# Patient Record
Sex: FEMALE | Race: WHITE | Employment: FULL TIME | URBAN - METROPOLITAN AREA
[De-identification: names, ages, dates, MRNs, and addresses within clinical notes are randomized per-mention and may not be internally consistent; named-entity substitution may affect disease eponyms.]

---
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
## 2021-12-26 ENCOUNTER — HOSPITAL ENCOUNTER (EMERGENCY)
Age: 21
Discharge: HOME | End: 2021-12-26
Attending: EMERGENCY MEDICINE
Payer: MEDICAID

## 2021-12-26 VITALS
TEMPERATURE: 97.8 F | OXYGEN SATURATION: 97 % | SYSTOLIC BLOOD PRESSURE: 140 MMHG | DIASTOLIC BLOOD PRESSURE: 90 MMHG | HEIGHT: 69 IN | RESPIRATION RATE: 16 BRPM | BODY MASS INDEX: 43.4 KG/M2 | WEIGHT: 293 LBS | HEART RATE: 94 BPM

## 2021-12-26 DIAGNOSIS — F17.200 SMOKING: ICD-10-CM

## 2021-12-26 DIAGNOSIS — J40 BRONCHITIS: Primary | ICD-10-CM

## 2021-12-26 PROCEDURE — 99283 EMERGENCY DEPT VISIT LOW MDM: CPT

## 2021-12-26 RX ORDER — ALBUTEROL SULFATE 90 UG/1
2 AEROSOL, METERED RESPIRATORY (INHALATION) EVERY 6 HOURS PRN
Qty: 1 EACH | Refills: 0 | Status: SHIPPED | OUTPATIENT
Start: 2021-12-26 | End: 2022-01-09 | Stop reason: ALTCHOICE

## 2021-12-26 RX ORDER — ALBUTEROL SULFATE 90 UG/1
2 AEROSOL, METERED RESPIRATORY (INHALATION) EVERY 6 HOURS PRN
Qty: 1 EACH | Refills: 0 | Status: SHIPPED | OUTPATIENT
Start: 2021-12-26 | End: 2021-12-26 | Stop reason: SDUPTHER

## 2021-12-26 RX ORDER — PREDNISONE 20 MG/1
TABLET ORAL
Qty: 18 TABLET | Refills: 0 | Status: SHIPPED | OUTPATIENT
Start: 2021-12-26 | End: 2022-01-05

## 2021-12-26 RX ORDER — PREDNISONE 20 MG/1
TABLET ORAL
Qty: 18 TABLET | Refills: 0 | Status: SHIPPED | OUTPATIENT
Start: 2021-12-26 | End: 2021-12-26 | Stop reason: SDUPTHER

## 2021-12-26 ASSESSMENT — PAIN SCALES - GENERAL: PAINLEVEL_OUTOF10: 6

## 2021-12-26 ASSESSMENT — PAIN DESCRIPTION - DESCRIPTORS: DESCRIPTORS: ACHING

## 2021-12-26 ASSESSMENT — PAIN DESCRIPTION - LOCATION: LOCATION: CHEST

## 2021-12-27 NOTE — ED PROVIDER NOTES
[address] [address] [address] [address]  Dept: [phone]  Loc: [address] ENCOUNTER        This patient was not seen or evaluated by the attending physician. I evaluated this patient, the attending physician was available for consultation. CHIEF COMPLAINT    Chief Complaint   Patient presents with    Cough     x3wks multiple exposures to covid       HPI    Doc Carvajal is a 24 y.o. female who presents to the emergency department with a 3-week complaint of nonproductive cough and intermittent periods of wheezing. She denies body aches, fever, chills, chest pain. She states she is been around people with Covid. She has not been vaccinated with Covid or influenza. She does not want to be checked for Covid or influenza either. She is a cigarette smoker and she has a history of asthma. She is also complaining of dry itchy skin to her arms and legs. REVIEW OF SYSTEMS    Pulmonary: No difficulty breathing or hemoptysis  General: No fevers or syncope  GI: No vomiting or diarrhea  ENT: see HPI, no sore throat    PAST MEDICAL AND SURGICAL HISTORY    Past Medical History:   Diagnosis Date    Asthma     Depression      History reviewed. No pertinent surgical history. CURRENT MEDICATIONS  (may include discharge medications prescribed in the ED)  Current Outpatient Rx   Medication Sig Dispense Refill    albuterol sulfate HFA (VENTOLIN HFA) 108 (90 Base) MCG/ACT inhaler Inhale 2 puffs into the lungs every 6 hours as needed for Wheezing or Shortness of Breath 1 each 0    mineral oil-hydrophilic petrolatum (AQUAPHOR) ointment Apply topically as needed.  1 each 0    predniSONE (DELTASONE) 20 MG tablet 3 tabs po qam for 3 days then 2 tabs qam for 3 days the 1 tab qam for 3 days 18 tablet 0    fluticasone-vilanterol (BREO ELLIPTA) 100-25 MCG/INH AEPB inhaler Inhale 1 puff into the lungs daily 1 each 2 ALLERGIES    Allergies   Allergen Reactions    Pcn [Penicillins] Hives       FAMILY AND SOCIAL HISTORY    History reviewed. No pertinent family history. Social History     Socioeconomic History    Marital status: Single     Spouse name: None    Number of children: None    Years of education: None    Highest education level: None   Occupational History    None   Tobacco Use    Smoking status: Current Every Day Smoker     Types: Cigarettes    Smokeless tobacco: Never Used   Substance and Sexual Activity    Alcohol use: No    Drug use: None    Sexual activity: None   Other Topics Concern    None   Social History Narrative    None     Social Determinants of Health     Financial Resource Strain:     Difficulty of Paying Living Expenses: Not on file   Food Insecurity:     Worried About Running Out of Food in the Last Year: Not on file    Liliana of Food in the Last Year: Not on file   Transportation Needs:     Lack of Transportation (Medical): Not on file    Lack of Transportation (Non-Medical):  Not on file   Physical Activity:     Days of Exercise per Week: Not on file    Minutes of Exercise per Session: Not on file   Stress:     Feeling of Stress : Not on file   Social Connections:     Frequency of Communication with Friends and Family: Not on file    Frequency of Social Gatherings with Friends and Family: Not on file    Attends Latter day Services: Not on file    Active Member of [address] [place] [address] [employer] or Organizations: Not on file    Attends Club or Organization Meetings: Not on file    Marital Status: Not on file   Intimate Partner Violence:     Fear of Current or Ex-Partner: Not on file    Emotionally Abused: Not on file    Physically Abused: Not on file    Sexually Abused: Not on file   Housing Stability:     Unable to Pay for Housing in the Last Year: Not on file    Number of [place] in the Last Year: Not on file    Unstable Housing in the Last Year: Not on file       PHYSICAL EXAM VITAL SIGNS: BP (!) 140/90   Pulse 94   Temp 97.8 °F (36.6 °C) (Oral)   Resp 16   Ht 5' 9\" (1.753 m)   Wt (!) 301 lb 12.8 oz (136.9 kg)   SpO2 97%   BMI 44.57 kg/m²   Constitutional:  Well developed, well nourished, no acute distress  Eyes: Sclera nonicteric, conjunctiva normal   Ears: bilateral ear canal appears nonerythematous and the ipsilateral TM appears grey and nonbulging, the mastoid and pinna are without redness or swelling   Throat/Face:  nonerythematous throat, no exudates, no trismus  Neck: Supple, no enlarged tonsillar LAD  Respiratory:  Lungs clear to auscultation bilaterally, no retractions  Cardiovascular:  Regular rate, no murmurs  Musculoskeletal:  No edema   Neurologic: Awake alert and oriented, and no slurred speech  Integument:  Skin is warm and dry, no rash    RADIOLOGY/PROCEDURES    No orders to display     Labs Reviewed - No data to display    ED [facility]    See chart for details of medications given during the ED stay. Medications - No data to display     I have seen and evaluated this patient. My attending physician was available for consultation. Differential diagnosis includes but is not limited to bronchitis, URI, asthma exacerbation, pneumonia, Covid, influenza, other. She is nontoxic in appearance and hemodynamically stable. 3 weeks of cough. She is a cigarette smoker. She reports that she has been around multiple people who have tested positive for Covid. She is not vaccinated. I offered to check her for Covid today and she declined testing. I will send her home with prednisone and an albuterol inhaler for her coughing and reports of wheezing and a PCP referral for follow-up. She was instructed to return to the emergency department for worsening symptoms. The patient verbalized understanding of the discharge instructions. FINAL IMPRESSION    1. Bronchitis    2.  Smoking        PLAN  Outpatient treatment, discharge instructions, and follow-up (see EMR)    (Please note that this note was completed with a voice recognition program.  Every attempt was made to edit the dictations, but inevitably there remain words that are mis-transcribed.)        JUDITH Chan - CNP  12/26/21 2333

## 2022-01-09 ENCOUNTER — APPOINTMENT (OUTPATIENT)
Dept: GENERAL RADIOLOGY | Age: 22
End: 2022-01-09
Payer: MEDICAID

## 2022-01-09 ENCOUNTER — HOSPITAL ENCOUNTER (EMERGENCY)
Age: 22
Discharge: HOME | End: 2022-01-09
Payer: MEDICAID

## 2022-01-09 DIAGNOSIS — H66.90 ACUTE OTITIS MEDIA, UNSPECIFIED OTITIS MEDIA TYPE: ICD-10-CM

## 2022-01-09 DIAGNOSIS — J40 BRONCHITIS: Primary | ICD-10-CM

## 2022-01-09 DIAGNOSIS — Z20.822 COVID-19 VIRUS TEST RESULT UNKNOWN: ICD-10-CM

## 2022-01-09 PROCEDURE — 99283 EMERGENCY DEPT VISIT LOW MDM: CPT

## 2022-01-09 PROCEDURE — 71045 X-RAY EXAM CHEST 1 VIEW: CPT

## 2022-01-09 PROCEDURE — U0003 INFECTIOUS AGENT DETECTION BY NUCLEIC ACID (DNA OR RNA); SEVERE ACUTE RESPIRATORY SYNDROME CORONAVIRUS 2 (SARS-COV-2) (CORONAVIRUS DISEASE [COVID-19]), AMPLIFIED PROBE TECHNIQUE, MAKING USE OF HIGH THROUGHPUT TECHNOLOGIES AS DESCRIBED BY CMS-2020-01-R: HCPCS

## 2022-01-09 PROCEDURE — 6370000000 HC RX 637 (ALT 250 FOR IP): Performed by: NURSE PRACTITIONER

## 2022-01-09 PROCEDURE — U0005 INFEC AGEN DETEC AMPLI PROBE: HCPCS

## 2022-01-09 RX ORDER — DOXYCYCLINE HYCLATE 100 MG
100 TABLET ORAL ONCE
Status: COMPLETED | OUTPATIENT
Start: 2022-01-09 | End: 2022-01-09

## 2022-01-09 RX ORDER — DOXYCYCLINE HYCLATE 100 MG
100 TABLET ORAL 2 TIMES DAILY
Qty: 14 TABLET | Refills: 0 | Status: SHIPPED | OUTPATIENT
Start: 2022-01-09 | End: 2022-01-16

## 2022-01-09 RX ORDER — PREDNISONE 10 MG/1
60 TABLET ORAL DAILY
Qty: 30 TABLET | Refills: 0 | Status: SHIPPED | OUTPATIENT
Start: 2022-01-09 | End: 2022-01-14

## 2022-01-09 RX ORDER — PREDNISONE 20 MG/1
60 TABLET ORAL ONCE
Status: COMPLETED | OUTPATIENT
Start: 2022-01-09 | End: 2022-01-09

## 2022-01-09 RX ORDER — IPRATROPIUM BROMIDE AND ALBUTEROL SULFATE 2.5; .5 MG/3ML; MG/3ML
1 SOLUTION RESPIRATORY (INHALATION) ONCE
Status: COMPLETED | OUTPATIENT
Start: 2022-01-09 | End: 2022-01-09

## 2022-01-09 RX ADMIN — DOXYCYCLINE HYCLATE 100 MG: 100 TABLET, COATED ORAL at 18:12

## 2022-01-09 RX ADMIN — PREDNISONE 60 MG: 20 TABLET ORAL at 18:12

## 2022-01-09 RX ADMIN — IPRATROPIUM BROMIDE AND ALBUTEROL SULFATE 1 AMPULE: .5; 3 SOLUTION RESPIRATORY (INHALATION) at 18:15

## 2022-01-09 ASSESSMENT — ENCOUNTER SYMPTOMS
CHEST TIGHTNESS: 0
GASTROINTESTINAL NEGATIVE: 1
TROUBLE SWALLOWING: 0
ABDOMINAL PAIN: 0
DIARRHEA: 0
VOMITING: 0
EYE ITCHING: 0
NAUSEA: 0
EYE PAIN: 0
SINUS PAIN: 0
SHORTNESS OF BREATH: 0
BACK PAIN: 0
SORE THROAT: 0
EYE DISCHARGE: 0
WHEEZING: 1
PHOTOPHOBIA: 0
VOICE CHANGE: 0
COUGH: 1

## 2022-01-09 ASSESSMENT — PAIN SCALES - GENERAL
PAINLEVEL_OUTOF10: 4
PAINLEVEL_OUTOF10: 4

## 2022-01-09 ASSESSMENT — PAIN DESCRIPTION - PAIN TYPE: TYPE: ACUTE PAIN

## 2022-01-09 ASSESSMENT — PAIN DESCRIPTION - LOCATION: LOCATION: THROAT;EAR

## 2022-01-09 ASSESSMENT — PAIN DESCRIPTION - DESCRIPTORS: DESCRIPTORS: ACHING;SORE

## 2022-01-09 NOTE — ED NOTES
Lung sounds clear . HHN begun.    Pulse 72  O2 sat 100%  RR 21     [facility]Universal [facility] Services  01/09/22 3898

## 2022-01-09 NOTE — Clinical Note
Jan Asencio was seen and treated in our emergency department on 1/9/2022. She may return to work on 01/12/2022. Can return back to work if your Covid test is negative and you are without a fever. If you have any questions or concerns, please don't hesitate to call.       Lilly Combs, APRN - CNP

## 2022-01-09 NOTE — ED PROVIDER NOTES
[address] [facility] ENCOUNTER        Pt Name: Esmer Santana  MRN: [id number]  [place] 2000  Date of evaluation: 1/9/2022  Provider: JUDITH Verdugo CNP  PCP: No primary care provider on file. Note Started: 5:51 PM EST       CLAUDIA. I have evaluated this patient. My supervising physician was available for consultation. CHIEF COMPLAINT       No chief complaint on file. HISTORY OF PRESENT ILLNESS   (Location, Timing/Onset, Context/Setting, Quality, Duration, Modifying Factors, Severity, Associated Signs and Symptoms)  Note limiting factors. Chief Complaint: Persistent cough, bilateral ear pain    Esmer Santana is a 24 y.o. female who presents to the ED today with complaints of persistent cough. She states that \"I been sick for 3 months straight. \"  She was seen late December, diagnosed with bronchitis. Did not get a Covid test at that time. She was sent home with medications for symptoms including a steroid Dosepak. She states that this helped only mildly. She has continued to smoke cigarettes throughout her illness. She is not running any fevers or chills. No nausea, vomiting or diarrhea. No abdominal pain, no chest pain. No shortness of breath. Cough is dry nonproductive. She is complaining of bilateral otalgia but no drainage from the site. Came to the ED for further evaluation and treatment. She states that \"I guess I have to get Covid testing now so that I can go back to work. \"    Nursing Notes were all reviewed and agreed with or any disagreements were addressed in the HPI. REVIEW OF SYSTEMS    (2-9 systems for level 4, 10 or more for level 5)     Review of Systems   Constitutional: Negative for chills, fatigue and fever. HENT: Positive for ear pain. Negative for congestion, drooling, ear discharge, sinus pain, sore throat, trouble swallowing and voice change.     Eyes: Negative for photophobia, pain, discharge, itching and visual disturbance. Respiratory: Positive for cough and wheezing. Negative for chest tightness and shortness of breath. Cardiovascular: Negative for chest pain and leg swelling. Gastrointestinal: Negative. Negative for abdominal pain, diarrhea, nausea and vomiting. Genitourinary: Negative for dysuria and hematuria. Musculoskeletal: Negative for back pain, myalgias, neck pain and neck stiffness. Skin: Negative for rash and wound. Allergic/Immunologic: Negative for immunocompromised state. Neurological: Negative for dizziness and light-headedness. All other systems reviewed and are negative. Positives and Pertinent negatives as per HPI. Except as noted above in the ROS, all other systems were reviewed and negative. PAST MEDICAL HISTORY     Past Medical History:   Diagnosis Date    Asthma     Depression          SURGICAL HISTORY   No past surgical history on file. CURRENTMEDICATIONS       Previous Medications    ALBUTEROL SULFATE HFA (VENTOLIN HFA) 108 (90 BASE) MCG/ACT INHALER    Inhale 2 puffs into the lungs every 6 hours as needed for Wheezing or Shortness of Breath    FLUTICASONE-VILANTEROL (BREO ELLIPTA) 100-25 MCG/INH AEPB INHALER    Inhale 1 puff into the lungs daily         ALLERGIES     Pcn [penicillins]    FAMILYHISTORY     No family history on file. SOCIAL HISTORY       Social History     Tobacco Use    Smoking status: Current Every Day Smoker     Types: Cigarettes    Smokeless tobacco: Never Used   Substance Use Topics    Alcohol use: No    Drug use: Not on file       SCREENINGS             PHYSICAL EXAM    (up to 7 for level 4, 8 or more for level 5)     ED Triage Vitals [01/09/22 1740]   BP Temp Temp Source Pulse Resp SpO2 Height Weight   (!) 137/99 97.6 °F (36.4 °C) Oral 88 18 96 % 5' 9\" (1.753 m) (!) 306 lb (138.8 kg)       Physical Exam  Vitals and nursing note reviewed. Constitutional:       General: She is not in acute distress.      Appearance: Normal appearance. She is obese. She is not ill-appearing, toxic-appearing or diaphoretic. HENT:      Head: Normocephalic and atraumatic. No raccoon eyes, Suero's sign, right periorbital erythema or left periorbital erythema. Right Ear: Hearing normal. No drainage, swelling or tenderness. There is no impacted cerumen. No foreign body. No mastoid tenderness. No hemotympanum. Tympanic membrane is erythematous and bulging. Tympanic membrane is not scarred, perforated or retracted. Left Ear: Hearing normal. No drainage, swelling or tenderness. There is no impacted cerumen. No foreign body. No mastoid tenderness. No hemotympanum. Tympanic membrane is erythematous and bulging. Tympanic membrane is not scarred, perforated or retracted. Ears:      Comments: Does have some erythema of the external ear canal bilaterally     Nose: Nose normal. No laceration, nasal tenderness, mucosal edema, congestion or rhinorrhea. Right Nostril: No epistaxis. Left Nostril: No epistaxis. Mouth/Throat:      Lips: Pink. No lesions. Mouth: Mucous membranes are moist.      Tongue: No lesions. Tongue does not deviate from midline. Pharynx: Oropharynx is clear. Uvula midline. No pharyngeal swelling, oropharyngeal exudate, posterior oropharyngeal erythema or uvula swelling. Tonsils: No tonsillar exudate or tonsillar abscesses. Eyes:      General: Lids are normal.         Right eye: No discharge. Left eye: No discharge. Extraocular Movements: Extraocular movements intact. Neck:      Trachea: Phonation normal. No abnormal tracheal secretions or tracheal deviation. Comments: No meningismus   Cardiovascular:      Rate and Rhythm: Normal rate and regular rhythm. Pulses: Normal pulses. Heart sounds: Normal heart sounds. No murmur heard. No friction rub. No gallop. Pulmonary:      Effort: Pulmonary effort is normal. No respiratory distress. Breath sounds: No stridor.  Wheezing present. No rhonchi or rales. Comments: +bilateral frontal exp wheezing on exam. Dry hacking cough on exam.  Musculoskeletal:         General: Normal range of motion. Cervical back: Full passive range of motion without pain, normal range of motion and neck supple. No rigidity. No spinous process tenderness or muscular tenderness. Lymphadenopathy:      Cervical: No cervical adenopathy. Skin:     General: Skin is warm and dry. Capillary Refill: Capillary refill takes less than 2 seconds. Neurological:      General: No focal deficit present. Mental Status: She is alert and oriented to person, place, and time. GCS: GCS eye subscore is 4. GCS verbal subscore is 5. GCS motor subscore is 6. Sensory: Sensation is intact. Motor: Motor function is intact. Gait: Gait is intact. Psychiatric:         Mood and Affect: Mood normal.         Behavior: Behavior normal.         DIAGNOSTIC RESULTS   LABS:    Labs Reviewed   [id number]   COVID-19   COVID-19   COVID-19       When ordered only abnormal lab results are displayed. All other labs were within normal range or not returned as of this dictation. EKG: When ordered, EKG's are interpreted by the Emergency Department Physician in the absence of a cardiologist.  Please see their note for interpretation of EKG. RADIOLOGY:   Non-plain film images such as CT, Ultrasound and MRI are read by the radiologist. Plain radiographic images are visualized and preliminarily interpreted by the ED Provider with the below findings:        Interpretation per the Radiologist below, if available at the time of this note:    XR CHEST PORTABLE   Final Result   No acute cardiopulmonary process           No results found.         PROCEDURES   Unless otherwise noted below, none     Procedures    CRITICAL CARE TIME   CRITICAL CARE NOTE:  There was a high probability of clinically significant life-threatening deterioration of the patient's condition requiring my urgent intervention. Total critical care time was at least 5 minutes. This includes vital sign monitoring, pulse oximetry monitoring, telemetry monitoring, clinical response to the IV medications, reviewing the nursing notes, consultation time, dictation/documentation time, and interpretation of the labwork. This excludes any separately billable procedures performed. CONSULTS:  None      EMERGENCY DEPARTMENT COURSE and DIFFERENTIAL DIAGNOSIS/MDM:   Vitals:    Vitals:    01/09/22 1740   BP: (!) 137/99   Pulse: 88   Resp: 18   Temp: 97.6 °F (36.4 °C)   TempSrc: Oral   SpO2: 96%   Weight: (!) 306 lb (138.8 kg)   Height: 5' 9\" (1.753 m)       Patient was given the following medications:  Medications   predniSONE (DELTASONE) tablet 60 mg (has no administration in time range)   doxycycline hyclate (VIBRA-TABS) tablet 100 mg (has no administration in time range)   ipratropium-albuterol (DUONEB) nebulizer solution 1 ampule (has no administration in time range)           MDM: See HPI and above for full presentation physical exam.  Differential diagnoses include bronchitis, URI, asthma exacerbation, early COPD, viral pneumonia, bacterial pneumonia, sinusitis, otitis externa, otitis media, acute serous otitis media, other viral illness, other    Covid PCR was sent and is in process. Plain films as read by the radiologist as above    Reevaluation patient's lung sounds after breathing treatment showed improvement, lungs are clear to auscultation. She is feeling better. We will start her on a steroid Dosepak, she already has an albuterol and Spiriva prescriptions.     Covid Decompensation Risk Scale    Screen for Imminent Risk:  -O2 Sat <95%, SBP <100 or DBP <60?  -RR >22?  -Age 61+ AND Pulse >100 at time of disposition? ---------> High Risk- Recommend Admission      Clinical Risk Score:                                                                                                                       -history by mouth 2 times daily for 7 days    NEOMYCIN-POLYMYXIN-HYDROCORTISONE (CORTISPORIN) [id number] OTIC SOLUTION    Place 4 drops into both ears 3 times daily for 10 days Instill into both ears    PREDNISONE (DELTASONE) 10 MG TABLET    Take 6 tablets by mouth daily for 5 doses       DISCONTINUED MEDICATIONS:  Discontinued Medications    MINERAL OIL-HYDROPHILIC PETROLATUM (AQUAPHOR) OINTMENT    Apply topically as needed.               (Please note that portions of this note were completed with a voice recognition program.  Efforts were made to edit the dictations but occasionally words are mis-transcribed.)    JUDITH Barnes CNP (electronically signed)            JUDITH Barnes CNP  01/09/22 4614

## 2022-01-09 NOTE — ED NOTES
HHN continues. Pulse 84 RR 20 O2 sat 100%   Lung sounds clear.      Maria T Feliz, ROLAND  01/09/22 3201

## 2022-01-09 NOTE — ED NOTES
sick for 2 months. reports feeling worse in past 10 days  with sore throat, nasal congestion, left ear pain and cough. pt states hasn't been tested for covid but has been quarantining for past 3 weeks.        Huey Baez RN  01/09/22 4812

## 2022-01-10 ENCOUNTER — CARE COORDINATION (OUTPATIENT)
Dept: CARE COORDINATION | Age: 22
End: 2022-01-10

## 2022-01-10 LAB — SARS-COV-2: DETECTED

## 2022-01-10 NOTE — CARE COORDINATION
Patient contacted regarding COVID-19 diagnosis. Discussed COVID-19 related testing which was available at this time. Test results were positive. Patient informed of results, if available? Yes. Ambulatory Care Manager contacted the patient by telephone to perform post discharge assessment. Call within 2 business days of discharge: Yes. Verified name and  with patient as identifiers. Provided introduction to self, and explanation of the CTN/ACM role, and reason for call due to risk factors for infection and/or exposure to COVID-19. Symptoms reviewed with patient who verbalized the following symptoms: fatigue, cough and shortness of breath. Due to no new or worsening symptoms encounter was not routed to provider for escalation. Discussed follow-up appointments. If no appointment was previously scheduled, appointment scheduling offered: Yes. [facility] follow up appointment(s): No future appointments. Non-[facility] follow up appointment(s):     Pt does not have PCP   has preservices number, Declined this ACM calling today to get her established w a pcp    Non-face-to-face services provided:  Obtained and reviewed discharge summary and/or continuity of care documents  Reviewed and followed up on pending diagnostic tests and treatments-covid  Education of patient/family/caregiver/guardian to support self-management-return precautions, healthy diet, fluids rest cdc guidelines, sx mgt, pcp establishing- pre services process discussed, pt prefers [facility] herself     Advance Care Planning:   Does patient have an Advance Directive:  not on file. Educated patient about risk for severe COVID-19 due to risk factors according to CDC guidelines. ACM reviewed discharge instructions, medical action plan and red flag symptoms with the patient who verbalized understanding. Discussed COVID vaccination status: Yes. Education provided on COVID-19 vaccination as appropriate.  Discussed exposure protocols and quarantine with CDC Guidelines. Patient was given an opportunity to verbalize any questions and concerns and agrees to contact ACM or health care provider for questions related to their healthcare. Reviewed and educated patient on any new and changed medications related to discharge diagnosis     Was patient discharged with a pulse oximeter? No Discussed and confirmed pulse oximeter discharge instructions and when to notify provider or seek emergency care. ACM provided contact information. No further follow-up call identified based on severity of symptoms and risk factors.

## 2022-01-26 VITALS
WEIGHT: 293 LBS | DIASTOLIC BLOOD PRESSURE: 99 MMHG | RESPIRATION RATE: 18 BRPM | HEIGHT: 69 IN | TEMPERATURE: 97.6 F | OXYGEN SATURATION: 96 % | SYSTOLIC BLOOD PRESSURE: 137 MMHG | HEART RATE: 91 BPM | BODY MASS INDEX: 43.4 KG/M2

## 2022-01-26 NOTE — ED NOTES
Extensive discharge instructions with pt. Explained rx's. covid and covid quarantine teaching with pt. Ear and throat pain at 3. Encouraged follow up with PCP. Explained importance of having a family doctor for regular medical care. Explained how to get a family doctor. [place] clinic info sheet given. [facility] clinic list given. [facility] referral line given.    Cas Renner, RN  01/26/22 [address]  [address], RN  01/26/22 6685

## 2023-01-20 ENCOUNTER — HOSPITAL ENCOUNTER (EMERGENCY)
Age: 23
Discharge: HOME | End: 2023-01-20
Attending: EMERGENCY MEDICINE
Payer: MEDICAID

## 2023-01-20 VITALS
OXYGEN SATURATION: 96 % | RESPIRATION RATE: 16 BRPM | TEMPERATURE: 97.7 F | HEART RATE: 99 BPM | DIASTOLIC BLOOD PRESSURE: 88 MMHG | HEIGHT: 69 IN | WEIGHT: 293 LBS | SYSTOLIC BLOOD PRESSURE: 148 MMHG | BODY MASS INDEX: 43.4 KG/M2

## 2023-01-20 DIAGNOSIS — B96.89 BACTERIAL SINUSITIS: Primary | ICD-10-CM

## 2023-01-20 DIAGNOSIS — J32.9 BACTERIAL SINUSITIS: Primary | ICD-10-CM

## 2023-01-20 DIAGNOSIS — Z87.09 HISTORY OF ASTHMA: ICD-10-CM

## 2023-01-20 DIAGNOSIS — Z72.0 TOBACCO ABUSE: ICD-10-CM

## 2023-01-20 DIAGNOSIS — H66.90 ACUTE OTITIS MEDIA, UNSPECIFIED OTITIS MEDIA TYPE: ICD-10-CM

## 2023-01-20 PROCEDURE — 99283 EMERGENCY DEPT VISIT LOW MDM: CPT

## 2023-01-20 RX ORDER — DOXYCYCLINE HYCLATE 100 MG
100 TABLET ORAL 2 TIMES DAILY
Qty: 20 TABLET | Refills: 0 | Status: SHIPPED | OUTPATIENT
Start: 2023-01-20 | End: 2023-01-30

## 2023-01-20 RX ORDER — GUAIFENESIN 600 MG/1
600 TABLET, EXTENDED RELEASE ORAL 2 TIMES DAILY
Qty: 30 TABLET | Refills: 0 | Status: SHIPPED | OUTPATIENT
Start: 2023-01-20 | End: 2023-02-04

## 2023-01-20 ASSESSMENT — PAIN SCALES - GENERAL: PAINLEVEL_OUTOF10: 4

## 2023-01-20 ASSESSMENT — PAIN - FUNCTIONAL ASSESSMENT: PAIN_FUNCTIONAL_ASSESSMENT: 0-10

## 2023-01-20 ASSESSMENT — PAIN DESCRIPTION - LOCATION: LOCATION: FACE

## 2023-01-20 NOTE — ED PROVIDER NOTES
[id number] [facility]      CHIEF COMPLAINT  Facial Pain (C/o sinus pain for 1 week)       HISTORY OF PRESENT ILLNESS  Kelle Wong is a 20 y.o. female  who presents to the ED complaining of sinus pressure and facial pain. Symptoms have been ongoing for the past several weeks but worsening especially over the past week. No known fevers. She is having otalgia also associated with this. She has been having congestion and drainage. No significant cough but she does have a history of asthma and does smoke. No other complaints, modifying factors or associated symptoms. I have reviewed the following from the nursing documentation. Past Medical History:   Diagnosis Date    Asthma     Depression      History reviewed. No pertinent surgical history. History reviewed. No pertinent family history. Social History     Socioeconomic History    Marital status: Single     Spouse name: Not on file    Number of children: Not on file    Years of education: Not on file    Highest education level: Not on file   Occupational History    Not on file   Tobacco Use    Smoking status: Every Day     Packs/day: 0.50     Types: Cigarettes    Smokeless tobacco: Never   Substance and Sexual Activity    Alcohol use: No    Drug use: Never    Sexual activity: Not on file   Other Topics Concern    Not on file   Social History Narrative    Not on file     Social Determinants of Health     Financial Resource Strain: Not on file   Food Insecurity: Not on file   Transportation Needs: Not on file   Physical Activity: Not on file   Stress: Not on file   Social Connections: Not on file   Intimate Partner Violence: Not on file   Housing Stability: Not on file     No current facility-administered medications for this encounter.      Current Outpatient Medications   Medication Sig Dispense Refill    doxycycline hyclate (VIBRA-TABS) 100 MG tablet Take 1 tablet by mouth 2 times daily for 10 days 20 tablet 0    guaiFENesin (MUCINEX) 600 MG extended release tablet Take 1 tablet by mouth 2 times daily for 15 days 30 tablet 0     Allergies   Allergen Reactions    Pcn [Penicillins] Hives       PHYSICAL EXAM  BP (!) 148/88   Pulse 99   Temp 97.7 °F (36.5 °C) (Oral)   Resp 16   Ht 5' 9\" (1.753 m)   Wt (!) 305 lb 8.9 oz (138.6 kg)   LMP  (LMP Unknown)   SpO2 96%   BMI 45.12 kg/m²    GENERAL APPEARANCE: Awake and alert. Cooperative. Acute distress. HENT: Normocephalic. Atraumatic. Mucous membranes are moist.  No drooling or stridor. Cobblestoning noted of the posterior oropharynx. No unilateral swelling fullness of tonsillar pillars. Effusion noted bilaterally in the ears with minimal erythema. Patient with tenderness to percussion over the maxillary sinuses. No appreciable facial swelling or erythema though. NECK: Supple. No cervical lymphadenopathy. No nuchal rigidity. EYES: PERRL. EOM's grossly intact. HEART/CHEST: RRR. No murmurs. LUNGS: Respirations unlabored. CTAB. No wheezes rales or rhonchi good air exchange. Speaking comfortably in full sentences. ABDOMEN: No tenderness. Soft. Non-distended. No masses. No organomegaly. No guarding or rebound. MUSCULOSKELETAL: No extremity edema. Compartments soft. No deformity. No tenderness in the extremities. All extremities neurovascularly intact. SKIN: Warm and dry. No acute rashes. NEUROLOGICAL: Alert and oriented. CN's 2-12 intact. No gross facial drooping. No gross focal deficits. PSYCHIATRIC: Normal mood and affect. LABS  I have reviewed all labs for this visit. No results found for this visit on 01/20/23.       RADIOLOGY  None      ED COURSE/MDM  Patient presenting for evaluation of facial pain and she does not have any appreciable swelling or any evidence of airway compromise but given the length of symptoms for the patient I did consider possible bacterial cause of her sinusitis over viral therefore did not feel that empiric antibiotics were indicated. We will also prescribe Mucinex. I did not feel that doing any type of swab such as strep swab or COVID/flu would . We will place the patient on oral doxycycline as she does have a penicillin allergy. She does have a significant history of tobacco abuse which I feel further raises the suspicion for a bacterial sinusitis. Also, patient given smoking cessation counseling given the fact that she does have a significant history of asthma and although is not wheezing right now without any evidence of asthma exacerbation, certainly the combination of asthma and smoking are very concerning. Patient verbalized understanding and agreement. Patient will be discharged with close follow-up. All questions answered at time of discharge. Smoking Cessation counseling: yes, emphasized risk of smoking with known history of asthma. Sepsis:  Is this patient to be included in the SEP-1 Core Measure due to severe sepsis or septic shock? No   Exclusion criteria - the patient is NOT to be included for SEP-1 Core Measure due to:  2+ SIRS criteria are not met     Plan of care discussed with patient. Patient in agreement with plan. Strict return precautions have been given. I, Dr. Cassie Treadwell MD, am the primary clinician of record. During the patient's ED course, the patient was given:  Medications - No data to display     CLINICAL IMPRESSION  1. Bacterial sinusitis    2. Acute otitis media, unspecified otitis media type    3. Tobacco abuse    4. History of asthma        Blood pressure (!) 148/88, pulse 99, temperature 97.7 °F (36.5 °C), temperature source Oral, resp. rate 16, height 5' 9\" (1.753 m), weight (!) 305 lb 8.9 oz (138.6 kg), SpO2 96 %, not currently breastfeeding. Alba Sanabria was discharged to home in stable condition. Patient was given scripts for the following medications. I counseled patient how to take these medications.    New Prescriptions DOXYCYCLINE HYCLATE (VIBRA-TABS) 100 MG TABLET    Take 1 tablet by mouth 2 times daily for 10 days    GUAIFENESIN (MUCINEX) 600 MG EXTENDED RELEASE TABLET    Take 1 tablet by mouth 2 times daily for 15 days       Follow-up with:  Vivian Marianojennifer  [phone]  Schedule an appointment as soon as possible for a visit in 2 days  For recheck, To establish care with a primary care physician      DISCLAIMER: This chart was created using Dragon dictation software. Efforts were made by me to ensure accuracy, however some errors may be present due to limitations of this technology and occasionally words are not transcribed correctly.         Jud Shell MD  01/20/23 0498

## 2023-01-20 NOTE — ED NOTES
AVS reviewed with patient. Verbalized understanding. AVS was printed and given to patient. Prescriptions sent electronically to patients pharmacy of choice.      Melanie Carney) Deena Cantu RN  01/20/23 2369

## 2023-05-29 ENCOUNTER — HOSPITAL ENCOUNTER (EMERGENCY)
Age: 23
Discharge: HOME | End: 2023-05-29
Payer: MEDICAID

## 2023-05-29 VITALS
TEMPERATURE: 99 F | WEIGHT: 293 LBS | OXYGEN SATURATION: 99 % | HEIGHT: 69 IN | BODY MASS INDEX: 43.4 KG/M2 | DIASTOLIC BLOOD PRESSURE: 88 MMHG | RESPIRATION RATE: 14 BRPM | HEART RATE: 98 BPM | SYSTOLIC BLOOD PRESSURE: 120 MMHG

## 2023-05-29 DIAGNOSIS — J02.9 ACUTE PHARYNGITIS, UNSPECIFIED ETIOLOGY: Primary | ICD-10-CM

## 2023-05-29 LAB — S PYO AG THROAT QL: NEGATIVE

## 2023-05-29 PROCEDURE — 6370000000 HC RX 637 (ALT 250 FOR IP): Performed by: PHYSICIAN ASSISTANT

## 2023-05-29 PROCEDURE — 99283 EMERGENCY DEPT VISIT LOW MDM: CPT

## 2023-05-29 PROCEDURE — 87081 CULTURE SCREEN ONLY: CPT

## 2023-05-29 PROCEDURE — 87880 STREP A ASSAY W/OPTIC: CPT

## 2023-05-29 PROCEDURE — 6360000002 HC RX W HCPCS: Performed by: PHYSICIAN ASSISTANT

## 2023-05-29 PROCEDURE — 87077 CULTURE AEROBIC IDENTIFY: CPT

## 2023-05-29 RX ORDER — AZITHROMYCIN 250 MG/1
TABLET, FILM COATED ORAL
Qty: 1 PACKET | Refills: 0 | Status: SHIPPED | OUTPATIENT
Start: 2023-05-29 | End: 2023-06-08

## 2023-05-29 RX ORDER — DEXAMETHASONE 4 MG/1
10 TABLET ORAL EVERY 12 HOURS SCHEDULED
Status: DISCONTINUED | OUTPATIENT
Start: 2023-05-29 | End: 2023-05-29 | Stop reason: HOSPADM

## 2023-05-29 RX ORDER — AZITHROMYCIN 500 MG/1
500 TABLET, FILM COATED ORAL ONCE
Status: COMPLETED | OUTPATIENT
Start: 2023-05-29 | End: 2023-05-29

## 2023-05-29 RX ADMIN — AZITHROMYCIN MONOHYDRATE 500 MG: 500 TABLET ORAL at 17:56

## 2023-05-29 RX ADMIN — DEXAMETHASONE 10 MG: 4 TABLET ORAL at 17:57

## 2023-05-29 ASSESSMENT — PAIN - FUNCTIONAL ASSESSMENT
PAIN_FUNCTIONAL_ASSESSMENT: 0-10
PAIN_FUNCTIONAL_ASSESSMENT: 0-10

## 2023-05-29 ASSESSMENT — PAIN DESCRIPTION - DESCRIPTORS: DESCRIPTORS: SORE

## 2023-05-29 ASSESSMENT — PAIN SCALES - GENERAL
PAINLEVEL_OUTOF10: 8
PAINLEVEL_OUTOF10: 8
PAINLEVEL_OUTOF10: 9

## 2023-05-29 ASSESSMENT — PAIN DESCRIPTION - PAIN TYPE: TYPE: ACUTE PAIN

## 2023-05-29 ASSESSMENT — PAIN DESCRIPTION - FREQUENCY: FREQUENCY: CONTINUOUS

## 2023-05-29 ASSESSMENT — LIFESTYLE VARIABLES
HOW OFTEN DO YOU HAVE A DRINK CONTAINING ALCOHOL: NEVER
HOW MANY STANDARD DRINKS CONTAINING ALCOHOL DO YOU HAVE ON A TYPICAL DAY: PATIENT DOES NOT DRINK

## 2023-05-29 ASSESSMENT — PAIN DESCRIPTION - LOCATION: LOCATION: THROAT

## 2023-05-29 ASSESSMENT — PAIN DESCRIPTION - ONSET: ONSET: ON-GOING

## 2023-05-29 NOTE — ED NOTES
Dc'd to home  awake alert  aware to follow up with pmd  to increase fluids  to return for worsening or changes  walked out with ease  aware when next meds are due     Aishwarya Peters RN  05/29/23 0098

## 2023-05-29 NOTE — ED PROVIDER NOTES
[facility] ENCOUNTER        Pt Name: Peter Millan  MRN: [id number]  [place] 2000  Date of evaluation: 5/29/2023  Provider: Sonny Tavarez PA-C  PCP: No primary care provider on file. Note Started: 5:47 PM EDT 5/29/23      CLAUDIA. I have evaluated this patient. CHIEF COMPLAINT       Chief Complaint   Patient presents with    Pharyngitis     For 5 days  Hurts so bad she doesn't want to cough       HISTORY OF PRESENT ILLNESS: 1 or more Elements     History From: patient  Limitations to history : None    Kelle Diaz is a 24 y.o. female who presents to the emergency department by private complaining of sore throat x5 days. Patient states for the past 3 days she has had fever and chills. She reports pain with swallowing. She denies any cough, rhinorrhea, congestion, trouble breathing. No other complaints this time. No known sick contacts. Nursing Notes were all reviewed and agreed with or any disagreements were addressed in the HPI. REVIEW OF SYSTEMS :      Review of Systems    Positives and Pertinent negatives as per HPI. SURGICAL HISTORY   History reviewed. No pertinent surgical history. CURRENTMEDICATIONS       Previous Medications    No medications on file       ALLERGIES     Pcn [penicillins]    FAMILYHISTORY     History reviewed. No pertinent family history.      SOCIAL HISTORY       Social History     Tobacco Use    Smoking status: Every Day     Packs/day: 0.50     Types: Cigarettes    Smokeless tobacco: Never   Substance Use Topics    Alcohol use: No    Drug use: Never       SCREENINGS        Markell Coma Scale  Eye Opening: Spontaneous  Best Verbal Response: Oriented  Best Motor Response: Obeys commands  Markell Coma Scale Score: 15                CIWA Assessment  BP: 120/88  Pulse: (!) 104           PHYSICAL EXAM  1 or more Elements     ED Triage Vitals   BP Temp Temp src Pulse Respirations SpO2 Height Weight - Scale

## 2023-05-30 LAB
ORGANISM: ABNORMAL
S PYO THROAT QL CULT: ABNORMAL
S PYO THROAT QL CULT: ABNORMAL

## 2024-10-02 ENCOUNTER — APPOINTMENT (OUTPATIENT)
Dept: CT IMAGING | Age: 24
End: 2024-10-02

## 2024-10-02 ENCOUNTER — HOSPITAL ENCOUNTER (EMERGENCY)
Age: 24
Discharge: HOME | End: 2024-10-02
Attending: EMERGENCY MEDICINE

## 2024-10-02 VITALS
WEIGHT: 293 LBS | DIASTOLIC BLOOD PRESSURE: 88 MMHG | BODY MASS INDEX: 44.3 KG/M2 | OXYGEN SATURATION: 93 % | TEMPERATURE: 98.4 F | RESPIRATION RATE: 16 BRPM | SYSTOLIC BLOOD PRESSURE: 127 MMHG | HEART RATE: 74 BPM

## 2024-10-02 DIAGNOSIS — R51.9 NONINTRACTABLE HEADACHE, UNSPECIFIED CHRONICITY PATTERN, UNSPECIFIED HEADACHE TYPE: Primary | ICD-10-CM

## 2024-10-02 DIAGNOSIS — R41.840 DIFFICULTY CONCENTRATING: ICD-10-CM

## 2024-10-02 LAB
ANION GAP SERPL CALCULATED.3IONS-SCNC: 13 MMOL/L (ref 3–16)
BUN SERPL-MCNC: 12 MG/DL (ref 7–20)
CALCIUM SERPL-MCNC: 9.7 MG/DL (ref 8.3–10.6)
CHLORIDE SERPL-SCNC: 105 MMOL/L (ref 99–110)
CO2 SERPL-SCNC: 17 MMOL/L (ref 21–32)
CREAT SERPL-MCNC: <0.5 MG/DL (ref 0.6–1.1)
GFR SERPLBLD CREATININE-BSD FMLA CKD-EPI: >90 ML/MIN/{1.73_M2}
GLUCOSE SERPL-MCNC: 96 MG/DL (ref 70–99)
HCG SERPL QL: NEGATIVE
POTASSIUM SERPL-SCNC: 5.3 MMOL/L (ref 3.5–5.1)
SODIUM SERPL-SCNC: 135 MMOL/L (ref 136–145)

## 2024-10-02 PROCEDURE — 36415 COLL VENOUS BLD VENIPUNCTURE: CPT

## 2024-10-02 PROCEDURE — 6370000000 HC RX 637 (ALT 250 FOR IP): Performed by: EMERGENCY MEDICINE

## 2024-10-02 PROCEDURE — 80048 BASIC METABOLIC PNL TOTAL CA: CPT

## 2024-10-02 PROCEDURE — 84703 CHORIONIC GONADOTROPIN ASSAY: CPT

## 2024-10-02 PROCEDURE — 99284 EMERGENCY DEPT VISIT MOD MDM: CPT

## 2024-10-02 PROCEDURE — 70450 CT HEAD/BRAIN W/O DYE: CPT

## 2024-10-02 RX ORDER — PROCHLORPERAZINE EDISYLATE 5 MG/ML
10 INJECTION INTRAMUSCULAR; INTRAVENOUS ONCE
Status: DISCONTINUED | OUTPATIENT
Start: 2024-10-02 | End: 2024-10-02

## 2024-10-02 RX ORDER — 0.9 % SODIUM CHLORIDE 0.9 %
1000 INTRAVENOUS SOLUTION INTRAVENOUS ONCE
Status: DISCONTINUED | OUTPATIENT
Start: 2024-10-02 | End: 2024-10-02

## 2024-10-02 RX ORDER — DIPHENHYDRAMINE HYDROCHLORIDE 50 MG/ML
25 INJECTION INTRAMUSCULAR; INTRAVENOUS ONCE
Status: DISCONTINUED | OUTPATIENT
Start: 2024-10-02 | End: 2024-10-02

## 2024-10-02 RX ORDER — BUTALBITAL, ACETAMINOPHEN AND CAFFEINE 50; 325; 40 MG/1; MG/1; MG/1
1 TABLET ORAL ONCE
Status: COMPLETED | OUTPATIENT
Start: 2024-10-02 | End: 2024-10-02

## 2024-10-02 RX ADMIN — BUTALBITAL, ACETAMINOPHEN AND CAFFEINE 1 TABLET: 325; 50; 40 TABLET ORAL at 10:58

## 2024-10-02 ASSESSMENT — PAIN SCALES - GENERAL
PAINLEVEL_OUTOF10: 7
PAINLEVEL_OUTOF10: 7

## 2024-10-02 ASSESSMENT — PAIN - FUNCTIONAL ASSESSMENT: PAIN_FUNCTIONAL_ASSESSMENT: 0-10

## 2024-10-02 ASSESSMENT — PAIN DESCRIPTION - LOCATION
LOCATION: HEAD
LOCATION: HEAD

## 2024-10-02 NOTE — DISCHARGE INSTRUCTIONS
Dear Kelle Luque,     Thank you for the privilege of caring for you today in the Emergency Department.     Tylenol, over the counter, for headache.     Please return to the Emergency Department if you develop any new or worsening symptoms, weakness, numbness, change in speech, facial droop, visual changes, or for any other concerns.     Regards,     Johnie Granados MD, FACEP `

## 2024-10-02 NOTE — ED NOTES
Patient DCed from ED at this time. Discussed AVS, follow up, and scripts. They verbalized understanding. Reinforced that should symptoms persist or worsen to return to the ED. They verbalized understanding. Patient ambulated out of ED. RN thanked patient for choosing [facility].

## 2024-10-02 NOTE — ED NOTES
Pt to the ED with complaints of a headache behind her eyes for the past few days. Pt notes that she was feeling \"disoriented\" at work today and was unable to focus on anything.

## 2024-10-02 NOTE — ED PROVIDER NOTES
[facility]    CHIEF COMPLAINT  Headache (Pt states she has been having headaches behind her eyes for the past few days. Pt notes that she was feeling \"disoriented\" at work today, was unable to focus on things, and has never felt like this before. )       HISTORY OF PRESENT ILLNESS  Kelle Luque is a 24 y.o. female who presents to the ED complaining of headache and lightheadedness. Symptoms started about a week ago. She feels \"disoriented.\" Denies fever or chest pain. Complains of nausea and vomiting but no diarrhea. Emesis nonbloody. Headache gradual in onset. Chronic problem for the patient. This HA feels similar. Not worst headache of life. Not thunderclap. Denies focal weakness or numbness. No visual disturbance. Tried ibuprofen two hours ago -- that seemed to help a bit. By disoriented - patient states she's having trouble focusing.     I have reviewed the following from the nursing documentation:    Past Medical History:   Diagnosis Date    Asthma     Depression      History reviewed. No pertinent surgical history.  History reviewed. No pertinent family history.  Social History     Socioeconomic History    Marital status: Single     Spouse name: Not on file    Number of children: Not on file    Years of education: Not on file    Highest education level: Not on file   Occupational History    Not on file   Tobacco Use    Smoking status: Every Day     Current packs/day: 0.50     Types: Cigarettes, E-Cigarettes    Smokeless tobacco: Never   Vaping Use    Vaping status: Not on file   Substance and Sexual Activity    Alcohol use: No    Drug use: Yes     Types: Marijuana (Weed)     Comment: \"smoked weed the other day\" 10/2/2024    Sexual activity: Not on file   Other Topics Concern    Not on file   Social History Narrative    Not on file     Social Determinants of Health     Financial Resource Strain: Not on file   Food Insecurity: Not on file   Transportation Needs: Not on file   Physical

## 2025-01-21 ENCOUNTER — HOSPITAL ENCOUNTER (EMERGENCY)
Age: 25
Discharge: HOME | End: 2025-01-21
Attending: STUDENT IN AN ORGANIZED HEALTH CARE EDUCATION/TRAINING PROGRAM

## 2025-01-21 VITALS
HEART RATE: 84 BPM | BODY MASS INDEX: 43.1 KG/M2 | DIASTOLIC BLOOD PRESSURE: 84 MMHG | WEIGHT: 291.01 LBS | RESPIRATION RATE: 18 BRPM | OXYGEN SATURATION: 99 % | SYSTOLIC BLOOD PRESSURE: 157 MMHG | TEMPERATURE: 98.1 F | HEIGHT: 69 IN

## 2025-01-21 DIAGNOSIS — K04.7 DENTAL INFECTION: ICD-10-CM

## 2025-01-21 DIAGNOSIS — K08.89 PAIN, DENTAL: Primary | ICD-10-CM

## 2025-01-21 DIAGNOSIS — S02.5XXA CLOSED FRACTURE OF TOOTH, INITIAL ENCOUNTER: ICD-10-CM

## 2025-01-21 PROCEDURE — 99283 EMERGENCY DEPT VISIT LOW MDM: CPT

## 2025-01-21 RX ORDER — IBUPROFEN 600 MG/1
600 TABLET, FILM COATED ORAL EVERY 6 HOURS PRN
Qty: 30 TABLET | Refills: 0 | Status: SHIPPED | OUTPATIENT
Start: 2025-01-21

## 2025-01-21 RX ORDER — CLINDAMYCIN HYDROCHLORIDE 150 MG/1
450 CAPSULE ORAL 3 TIMES DAILY
Qty: 90 CAPSULE | Refills: 0 | Status: SHIPPED | OUTPATIENT
Start: 2025-01-21 | End: 2025-01-31

## 2025-01-21 RX ORDER — ACETAMINOPHEN 325 MG/1
650 TABLET ORAL EVERY 6 HOURS PRN
Qty: 30 TABLET | Refills: 0 | Status: SHIPPED | OUTPATIENT
Start: 2025-01-21

## 2025-01-21 ASSESSMENT — PAIN SCALES - GENERAL: PAINLEVEL_OUTOF10: 8

## 2025-01-21 ASSESSMENT — PAIN DESCRIPTION - ORIENTATION: ORIENTATION: LEFT

## 2025-01-21 ASSESSMENT — PAIN DESCRIPTION - DESCRIPTORS: DESCRIPTORS: ACHING;THROBBING

## 2025-01-21 ASSESSMENT — PAIN - FUNCTIONAL ASSESSMENT: PAIN_FUNCTIONAL_ASSESSMENT: 0-10

## 2025-01-21 ASSESSMENT — PAIN DESCRIPTION - LOCATION: LOCATION: TEETH

## 2025-01-22 NOTE — ED NOTES
D/C: Order noted for d/c. Pt confirmed d/c paperwork has correct name. Discharge and education instructions reviewed with patient. Teach-back successful.  Pt verbalized understanding and denied questions at this time. No acute distress noted. Patient instructed to follow-up as noted - return to emergency department if symptoms worsen. Patient verbalized understanding. Discharged per EDMD with discharge instructions. Pt discharged to private vehicle. Patient stable upon departure. Thanked patient for [facility] for care. Provider aware of patient pain at time of discharge.   
3 = A little assistance

## 2025-01-22 NOTE — ED TRIAGE NOTES
Pt states that she has had a tooth infection and needs antibiotics. Pt states that she has been taking ibuprofen for the pain and it no longer works. Pt states that she has 8/10 pain. Pt states she would like referral to a dentist. Pt denies SOB, chest pain, dizziness. Pt is A&Ox4, UAL, VSS on RA.

## 2025-01-22 NOTE — ED PROVIDER NOTES
[facility] EMERGENCY DEPARTMENT    CHIEF COMPLAINT  Dental Pain (Pt states that she has had a tooth infection and needs antibiotics. Pt states that she has been taking ibuprofen for the pain and it no longer works. Pt states that she has 8/10 pain. Pt denies SOB, chest pain, dizziness. Pt is A&Ox4, UAL, VSS on RA. )       HISTORY OF PRESENT ILLNESS  Kelle Luque is a 24 y.o. female presenting to the ED for dental pain.  Patient denies any fevers.  Onset of dental pain started this week.  Patient states she is having pain in upper right molar.    - History obtained from: Patient   - Limitations to history: none    I have reviewed the following from the nursing documentation:    Past Medical History:   Diagnosis Date    Asthma     Depression      History reviewed. No pertinent surgical history.  History reviewed. No pertinent family history.  Social History     Socioeconomic History    Marital status: Single     Spouse name: Not on file    Number of children: Not on file    Years of education: Not on file    Highest education level: Not on file   Occupational History    Not on file   Tobacco Use    Smoking status: Every Day     Current packs/day: 0.50     Types: Cigarettes, E-Cigarettes    Smokeless tobacco: Never   Vaping Use    Vaping status: Not on file   Substance and Sexual Activity    Alcohol use: No    Drug use: Yes     Types: Marijuana (Weed)     Comment: \"smoked weed the other day\" 10/2/2024    Sexual activity: Not on file   Other Topics Concern    Not on file   Social History Narrative    Not on file     Social Determinants of Health     Financial Resource Strain: Not on file   Food Insecurity: Not on file   Transportation Needs: Not on file   Physical Activity: Not on file   Stress: Not on file   Social Connections: Not on file   Intimate Partner Violence: Not on file   Housing Stability: Not on file     No current facility-administered medications for this encounter.     Current Outpatient

## 2025-01-22 NOTE — DISCHARGE INSTRUCTIONS
Please take Tylenol ibuprofen for pain control.  Please take medication as prescribed.  Listed below are dental clinics around [facility].    You have a dental problem that needs to be seen in a dentist office.  Below is a list of the [facility] department clinics that will see you.  They are open at 7 AM Monday through Friday and take a very limited number of emergencies.  You must get there by 530 to 6 in the morning and to ensure that you are one of the emergencies they do that day.  They will not make an appointment.  And if you are there in line but are one of the people at the back of the line, you will not be cared for.    [address] [phone]   Hours are Monday and Thursday 7:00a-1:00p and 2:00-4:00p; Friday 7:00a-1:00p   Emergency walk-ins accepted only on Tuesday, Wednesday, and Friday at 7 am (limited number, be early)  Residency: Resident of [facility]  Must be a resident of the [facility]: Bring proof of this residence (example: utility bill);   Sliding Fee Scale  *Scale requires proof of income (example: pay stub or tax return). Scale is based on family size and income. Discounts can be 25%, 50%, 75%, or 100% of all services.   Minimum Co-pay must be $30 if you have no insurance.  Medicaid, Insurance, Self-Pay    [facility]  [address] [place] [address] [id number] [phone]   Hours are M-Th 7:00a-1:00p and 2:00p-5:00p; F 7:00a-1:30p  Emergency walk-ins accepted Monday through Thursday at 7 am (limited number, be there early)  Residency: Resident of [facility]  Must be a resident of the [facility]: Bring proof of this residence (example: utility bill);   Sliding Fee Scale  *Scale requires proof of income (example: pay stub or tax return). Scale is based on family size and income. Discounts can be 25%, 50%, 75%, or 100% of all services.   Minimum Co-pay must be $30 if you have no